# Patient Record
Sex: MALE | Race: OTHER | NOT HISPANIC OR LATINO | URBAN - METROPOLITAN AREA
[De-identification: names, ages, dates, MRNs, and addresses within clinical notes are randomized per-mention and may not be internally consistent; named-entity substitution may affect disease eponyms.]

---

## 2023-03-15 ENCOUNTER — EMERGENCY (EMERGENCY)
Facility: HOSPITAL | Age: 28
LOS: 1 days | Discharge: HOME | End: 2023-03-15
Admitting: EMERGENCY MEDICINE
Payer: OTHER MISCELLANEOUS

## 2023-03-15 VITALS
RESPIRATION RATE: 15 BRPM | SYSTOLIC BLOOD PRESSURE: 128 MMHG | WEIGHT: 136.69 LBS | OXYGEN SATURATION: 98 % | HEART RATE: 72 BPM | HEIGHT: 69 IN | DIASTOLIC BLOOD PRESSURE: 72 MMHG | TEMPERATURE: 98 F
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
## 2023-03-15 DIAGNOSIS — M25.522 PAIN IN LEFT ELBOW: ICD-10-CM
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
## 2023-03-15 DIAGNOSIS — S50.312A ABRASION OF LEFT ELBOW, INITIAL ENCOUNTER: ICD-10-CM

## 2023-03-15 PROCEDURE — 73080 X-RAY EXAM OF ELBOW: CPT | Mod: 26,LT

## 2023-03-15 PROCEDURE — 99284 EMERGENCY DEPT VISIT MOD MDM: CPT

## 2023-03-15 RX ORDER — IBUPROFEN 200 MG
800 TABLET ORAL ONCE
Refills: 0 | Status: COMPLETED | OUTPATIENT
Start: 2023-03-15 | End: 2023-03-15

## 2023-03-15 RX ADMIN — Medication 800 MILLIGRAM(S): at 14:34

## 2023-03-15 NOTE — ED PROVIDER NOTE - NS ED ROS FT
+elbow pain  Denies fevers, chills, nausea, vomiting, diarrhea, constipation, abdominal pain, urinary symptoms, chest pain, palpitations, shortness of breath, dyspnea on exertion, syncope/near syncope, cough/URI symptoms, headache, weakness, numbness, focal deficits, visual changes, gait or balance changes, dizziness

## 2023-03-15 NOTE — ED ADULT NURSE NOTE - NSICDXNOPASTMEDICALHX_GEN_ALL_CORE
<-- Click to add NO pertinent Past Medical History Hydroxyzine Pregnancy And Lactation Text: This medication is not safe during pregnancy and should not be taken. It is also excreted in breast milk and breast feeding isn't recommended.

## 2023-03-15 NOTE — ED PROVIDER NOTE - PHYSICAL EXAMINATION
VITAL SIGNS: I have reviewed nursing notes and confirm.  CONSTITUTIONAL: Well-developed; well-nourished; in no acute distress.  SKIN: No acute rash. See EXT.  HEAD: Normocephalic; atraumatic.  CARD: No extremity cyanosis.  RESP: Speaks in full, clear sentences.  EXT: +ttp along the elbow, ROM intact but reproduces pain, small 0.25cm abrasion over elbow w/o surrounding erythema or swelling.  NEURO: Alert, oriented. Grossly unremarkable. No focal deficits. Fluent speech.   PSYCH: Cooperative, appropriate. Mood and affect wnl.

## 2023-03-15 NOTE — ED PROVIDER NOTE - PATIENT PORTAL LINK FT
You can access the FollowMyHealth Patient Portal offered by [facility] by registering at the following website: [email or web address]. By joining [employer]’s FollowMyHealth portal, you will also be able to view your health information using other applications (apps) compatible with our system.

## 2023-03-15 NOTE — ED ADULT NURSE NOTE - OBJECTIVE STATEMENT
Pt is a/ox3 c/o of accidently falling and hurting left elbow. Denies LOC, chest pain. Denies SOB. Denies blood thinners. Denies hitting head. +pain to left elbow, no deformities noted. No open skin noted, no swelling noted. Ambulates independently.

## 2023-03-15 NOTE — ED PROVIDER NOTE - OBJECTIVE STATEMENT
28-year-old male, denies past medical history, presenting to the emergency department complaining of left elbow pain after a mechanical fall at work today.  Patient was walking down steps when he slipped and fell.  Initially he thought he had injured his left hip, but patient states he no longer feels any pain or discomfort, only pain is noted in the elbow.  Patient did not hit his head and denies any LOC.  He has not taken any medications for symptoms prior to arriving. Denies paresthesias, elbow swelling, head strike or LOC.

## 2023-03-15 NOTE — ED PROVIDER NOTE - CLINICAL SUMMARY MEDICAL DECISION MAKING FREE TEXT BOX
28-year-old male, denies past medical history, presenting to the emergency department complaining of left elbow pain after a mechanical fall at work today.  Patient noted to have tenderness to palpation and pain with range of motion of the left elbow.  Will plan to obtain x-rays to evaluate for possible fracture.  There is also a very small abrasion noted on the superficial skin along the elbow.  The area is clean and dry without signs of infection.  Patient has no tenderness to palpation along the lateral hip with regular range of motion, will hold on imaging at this time.  No other red flags noted on exam.  Dispo pending medical work-up.

## 2023-03-15 NOTE — ED ADULT NURSE NOTE - NSIMPLEMENTINTERV_GEN_ALL_ED
Implemented All Fall Risk Interventions:  [place] to call system. Call bell, personal items and telephone within reach. Instruct patient to call for assistance. Room bathroom lighting operational. Non-slip footwear when patient is off stretcher. Physically safe environment: no spills, clutter or unnecessary equipment. Stretcher in lowest position, wheels locked, appropriate side rails in place. Provide visual cue, wrist band, yellow gown, etc. Monitor gait and stability. Monitor for mental status changes and reorient to person, place, and time. Review medications for side effects contributing to fall risk. Reinforce activity limits and safety measures with patient and family.

## 2023-03-15 NOTE — ED PROVIDER NOTE - PROGRESS NOTE DETAILS
X-rays reviewed, no evidence of fracture or sail sign.  We will plan to discharge patient with Ortho follow-up.  NSAIDs as needed for pain.  Patient stable as he leaves.

## 2023-03-15 NOTE — ED ADULT TRIAGE NOTE - CHIEF COMPLAINT QUOTE
patient walk in c/o left elbow pain and left hip pain c/o fall down stairs at work; slipped and slid down without hitting head; band aid on left elbow from abrasion

## 2023-03-15 NOTE — ED PROVIDER NOTE - CARE PROVIDER_API CALL
[facility])  Orthopaedic Surgery Surgery  [address] [place] [address]  Phone: [phone]  Fax: [phone]  Follow Up Time: 1-3 Days